# Patient Record
Sex: FEMALE | Race: WHITE | NOT HISPANIC OR LATINO | URBAN - METROPOLITAN AREA
[De-identification: names, ages, dates, MRNs, and addresses within clinical notes are randomized per-mention and may not be internally consistent; named-entity substitution may affect disease eponyms.]

---

## 2020-12-07 ENCOUNTER — INPATIENT HOSPITAL (OUTPATIENT)
Dept: URBAN - METROPOLITAN AREA HOSPITAL 34 | Facility: HOSPITAL | Age: 75
End: 2020-12-07
Payer: OTHER GOVERNMENT

## 2020-12-07 DIAGNOSIS — D62 ACUTE POSTHEMORRHAGIC ANEMIA: ICD-10-CM

## 2020-12-07 DIAGNOSIS — R10.13 EPIGASTRIC PAIN: ICD-10-CM

## 2020-12-07 DIAGNOSIS — K92.2 GASTROINTESTINAL HEMORRHAGE, UNSPECIFIED: ICD-10-CM

## 2020-12-07 DIAGNOSIS — U07.1 COVID-19: ICD-10-CM

## 2020-12-07 PROCEDURE — 99222 1ST HOSP IP/OBS MODERATE 55: CPT | Performed by: INTERNAL MEDICINE

## 2020-12-08 PROCEDURE — 99232 SBSQ HOSP IP/OBS MODERATE 35: CPT | Performed by: INTERNAL MEDICINE

## 2020-12-16 ENCOUNTER — OFFICE (OUTPATIENT)
Dept: URBAN - METROPOLITAN AREA CLINIC 34 | Facility: CLINIC | Age: 75
End: 2020-12-16
Payer: OTHER GOVERNMENT

## 2020-12-16 VITALS — HEIGHT: 64 IN

## 2020-12-16 DIAGNOSIS — K62.5 HEMORRHAGE OF ANUS AND RECTUM: ICD-10-CM

## 2020-12-16 DIAGNOSIS — Z87.11 PERSONAL HISTORY OF PEPTIC ULCER DISEASE: ICD-10-CM

## 2020-12-16 DIAGNOSIS — D50.9 IRON DEFICIENCY ANEMIA, UNSPECIFIED: ICD-10-CM

## 2020-12-16 DIAGNOSIS — K59.09 OTHER CONSTIPATION: ICD-10-CM

## 2020-12-16 DIAGNOSIS — K92.1 MELENA: ICD-10-CM

## 2020-12-16 PROCEDURE — 99205 OFFICE O/P NEW HI 60 MIN: CPT | Mod: 95 | Performed by: PHYSICIAN ASSISTANT

## 2020-12-16 NOTE — SERVICEHPINOTES
PATIENT VERIFIED BY DATE OF BIRTH AND NAME. Patient has been consented for this telecommunication visit.  RAFAELA KAY   is a   75   year old    white female who is being seen in consultation at the request of   [facility] ROUSSEAU   for "black stools" with concern about recurrent melena. First episode of melena in 09/20/2020 that was determined to be related to a "ruptured ulcer along old surgical incision" h/o gastric bypass in 2009 (per patient records from PCP). She has been taking po iron, pantoprazole and Carafate since September for this NIGEL. Most recent [facility] visit on 12/2020 for melena, fatigue, and low iron (requesting records). Prior colonoscopy was "normal" per pt's daughter in [place] around 2018. Pt moved from [place] to [facility] with her daughter. She has daily BMs with mild constipation at this time On OTC stool softeners that have helped. No known cardiac or pulmonary disease. No NSAID use. Denies chest pain, dyspnea, n/v, dysphagia, abdominal pain, diarrhea, weight loss. No other complaints. BR